# Patient Record
Sex: MALE | Race: OTHER | NOT HISPANIC OR LATINO | URBAN - METROPOLITAN AREA
[De-identification: names, ages, dates, MRNs, and addresses within clinical notes are randomized per-mention and may not be internally consistent; named-entity substitution may affect disease eponyms.]

---

## 2019-07-24 ENCOUNTER — INPATIENT (INPATIENT)
Facility: HOSPITAL | Age: 84
LOS: 1 days | Discharge: HOME | End: 2019-07-26
Attending: INTERNAL MEDICINE | Admitting: INTERNAL MEDICINE
Payer: MEDICARE

## 2019-07-24 VITALS
DIASTOLIC BLOOD PRESSURE: 85 MMHG | TEMPERATURE: 98 F | HEIGHT: 64 IN | OXYGEN SATURATION: 95 % | WEIGHT: 160.06 LBS | RESPIRATION RATE: 17 BRPM | HEART RATE: 76 BPM | SYSTOLIC BLOOD PRESSURE: 162 MMHG

## 2019-07-24 PROBLEM — Z00.00 ENCOUNTER FOR PREVENTIVE HEALTH EXAMINATION: Status: ACTIVE | Noted: 2019-07-24

## 2019-07-24 LAB
ALBUMIN SERPL ELPH-MCNC: 3.8 G/DL — SIGNIFICANT CHANGE UP (ref 3.3–5)
ALP SERPL-CCNC: 51 U/L — SIGNIFICANT CHANGE UP (ref 40–120)
ALT FLD-CCNC: 39 U/L — SIGNIFICANT CHANGE UP (ref 12–78)
AMYLASE P1 CFR SERPL: 133 U/L — HIGH (ref 25–115)
ANION GAP SERPL CALC-SCNC: 9 MMOL/L — SIGNIFICANT CHANGE UP (ref 5–17)
APPEARANCE UR: CLEAR — SIGNIFICANT CHANGE UP
AST SERPL-CCNC: 29 U/L — SIGNIFICANT CHANGE UP (ref 15–37)
BACTERIA # UR AUTO: ABNORMAL
BASOPHILS # BLD AUTO: 0.05 K/UL — SIGNIFICANT CHANGE UP (ref 0–0.2)
BASOPHILS NFR BLD AUTO: 0.6 % — SIGNIFICANT CHANGE UP (ref 0–2)
BILIRUB SERPL-MCNC: 0.8 MG/DL — SIGNIFICANT CHANGE UP (ref 0.2–1.2)
BILIRUB UR-MCNC: NEGATIVE — SIGNIFICANT CHANGE UP
BUN SERPL-MCNC: 25 MG/DL — HIGH (ref 7–23)
CALCIUM SERPL-MCNC: 9.1 MG/DL — SIGNIFICANT CHANGE UP (ref 8.5–10.1)
CHLORIDE SERPL-SCNC: 104 MMOL/L — SIGNIFICANT CHANGE UP (ref 96–108)
CO2 SERPL-SCNC: 23 MMOL/L — SIGNIFICANT CHANGE UP (ref 22–31)
COLOR SPEC: YELLOW — SIGNIFICANT CHANGE UP
CREAT SERPL-MCNC: 1.7 MG/DL — HIGH (ref 0.5–1.3)
DIFF PNL FLD: NEGATIVE — SIGNIFICANT CHANGE UP
EOSINOPHIL # BLD AUTO: 0.3 K/UL — SIGNIFICANT CHANGE UP (ref 0–0.5)
EOSINOPHIL NFR BLD AUTO: 3.6 % — SIGNIFICANT CHANGE UP (ref 0–6)
GLUCOSE SERPL-MCNC: 197 MG/DL — HIGH (ref 70–99)
GLUCOSE UR QL: NEGATIVE MG/DL — SIGNIFICANT CHANGE UP
HCT VFR BLD CALC: 41.8 % — SIGNIFICANT CHANGE UP (ref 39–50)
HGB BLD-MCNC: 13.6 G/DL — SIGNIFICANT CHANGE UP (ref 13–17)
IMM GRANULOCYTES NFR BLD AUTO: 0.7 % — SIGNIFICANT CHANGE UP (ref 0–1.5)
KETONES UR-MCNC: NEGATIVE — SIGNIFICANT CHANGE UP
LEUKOCYTE ESTERASE UR-ACNC: NEGATIVE — SIGNIFICANT CHANGE UP
LIDOCAIN IGE QN: 330 U/L — SIGNIFICANT CHANGE UP (ref 73–393)
LYMPHOCYTES # BLD AUTO: 1.45 K/UL — SIGNIFICANT CHANGE UP (ref 1–3.3)
LYMPHOCYTES # BLD AUTO: 17.5 % — SIGNIFICANT CHANGE UP (ref 13–44)
MAGNESIUM SERPL-MCNC: 2.3 MG/DL — SIGNIFICANT CHANGE UP (ref 1.6–2.6)
MCHC RBC-ENTMCNC: 26.6 PG — LOW (ref 27–34)
MCHC RBC-ENTMCNC: 32.5 GM/DL — SIGNIFICANT CHANGE UP (ref 32–36)
MCV RBC AUTO: 81.8 FL — SIGNIFICANT CHANGE UP (ref 80–100)
MONOCYTES # BLD AUTO: 0.54 K/UL — SIGNIFICANT CHANGE UP (ref 0–0.9)
MONOCYTES NFR BLD AUTO: 6.5 % — SIGNIFICANT CHANGE UP (ref 2–14)
NEUTROPHILS # BLD AUTO: 5.9 K/UL — SIGNIFICANT CHANGE UP (ref 1.8–7.4)
NEUTROPHILS NFR BLD AUTO: 71.1 % — SIGNIFICANT CHANGE UP (ref 43–77)
NITRITE UR-MCNC: NEGATIVE — SIGNIFICANT CHANGE UP
NRBC # BLD: 0 /100 WBCS — SIGNIFICANT CHANGE UP (ref 0–0)
PH UR: 6 — SIGNIFICANT CHANGE UP (ref 5–8)
PLATELET # BLD AUTO: 134 K/UL — LOW (ref 150–400)
POTASSIUM SERPL-MCNC: 4.1 MMOL/L — SIGNIFICANT CHANGE UP (ref 3.5–5.3)
POTASSIUM SERPL-SCNC: 4.1 MMOL/L — SIGNIFICANT CHANGE UP (ref 3.5–5.3)
PROT SERPL-MCNC: 8 GM/DL — SIGNIFICANT CHANGE UP (ref 6–8.3)
PROT UR-MCNC: 30 MG/DL
RBC # BLD: 5.11 M/UL — SIGNIFICANT CHANGE UP (ref 4.2–5.8)
RBC # FLD: 13.1 % — SIGNIFICANT CHANGE UP (ref 10.3–14.5)
SODIUM SERPL-SCNC: 136 MMOL/L — SIGNIFICANT CHANGE UP (ref 135–145)
SP GR SPEC: 1.01 — SIGNIFICANT CHANGE UP (ref 1.01–1.02)
TROPONIN I SERPL-MCNC: <.015 NG/ML — SIGNIFICANT CHANGE UP (ref 0.01–0.04)
UROBILINOGEN FLD QL: NEGATIVE MG/DL — SIGNIFICANT CHANGE UP
WBC # BLD: 8.3 K/UL — SIGNIFICANT CHANGE UP (ref 3.8–10.5)
WBC # FLD AUTO: 8.3 K/UL — SIGNIFICANT CHANGE UP (ref 3.8–10.5)
WBC UR QL: SIGNIFICANT CHANGE UP

## 2019-07-24 PROCEDURE — 93010 ELECTROCARDIOGRAM REPORT: CPT

## 2019-07-24 PROCEDURE — 70450 CT HEAD/BRAIN W/O DYE: CPT | Mod: 26

## 2019-07-24 PROCEDURE — 99285 EMERGENCY DEPT VISIT HI MDM: CPT

## 2019-07-24 PROCEDURE — 71045 X-RAY EXAM CHEST 1 VIEW: CPT | Mod: 26

## 2019-07-24 PROCEDURE — 99223 1ST HOSP IP/OBS HIGH 75: CPT

## 2019-07-24 RX ORDER — ASPIRIN/CALCIUM CARB/MAGNESIUM 324 MG
1 TABLET ORAL
Qty: 0 | Refills: 0 | DISCHARGE

## 2019-07-24 RX ORDER — DEXTROSE 50 % IN WATER 50 %
12.5 SYRINGE (ML) INTRAVENOUS ONCE
Refills: 0 | Status: DISCONTINUED | OUTPATIENT
Start: 2019-07-24 | End: 2019-07-26

## 2019-07-24 RX ORDER — MECLIZINE HCL 12.5 MG
25 TABLET ORAL ONCE
Refills: 0 | Status: COMPLETED | OUTPATIENT
Start: 2019-07-24 | End: 2019-07-24

## 2019-07-24 RX ORDER — METOPROLOL TARTRATE 50 MG
1 TABLET ORAL
Qty: 0 | Refills: 0 | DISCHARGE

## 2019-07-24 RX ORDER — DEXTROSE 50 % IN WATER 50 %
15 SYRINGE (ML) INTRAVENOUS ONCE
Refills: 0 | Status: DISCONTINUED | OUTPATIENT
Start: 2019-07-24 | End: 2019-07-26

## 2019-07-24 RX ORDER — SODIUM CHLORIDE 9 MG/ML
1000 INJECTION INTRAMUSCULAR; INTRAVENOUS; SUBCUTANEOUS ONCE
Refills: 0 | Status: COMPLETED | OUTPATIENT
Start: 2019-07-24 | End: 2019-07-24

## 2019-07-24 RX ORDER — GLUCAGON INJECTION, SOLUTION 0.5 MG/.1ML
1 INJECTION, SOLUTION SUBCUTANEOUS ONCE
Refills: 0 | Status: DISCONTINUED | OUTPATIENT
Start: 2019-07-24 | End: 2019-07-26

## 2019-07-24 RX ORDER — ASPIRIN/CALCIUM CARB/MAGNESIUM 324 MG
81 TABLET ORAL DAILY
Refills: 0 | Status: DISCONTINUED | OUTPATIENT
Start: 2019-07-24 | End: 2019-07-26

## 2019-07-24 RX ORDER — ONDANSETRON 8 MG/1
4 TABLET, FILM COATED ORAL ONCE
Refills: 0 | Status: COMPLETED | OUTPATIENT
Start: 2019-07-24 | End: 2019-07-24

## 2019-07-24 RX ORDER — ASPIRIN/CALCIUM CARB/MAGNESIUM 324 MG
300 TABLET ORAL DAILY
Refills: 0 | Status: DISCONTINUED | OUTPATIENT
Start: 2019-07-24 | End: 2019-07-24

## 2019-07-24 RX ORDER — SODIUM CHLORIDE 9 MG/ML
1000 INJECTION, SOLUTION INTRAVENOUS
Refills: 0 | Status: DISCONTINUED | OUTPATIENT
Start: 2019-07-24 | End: 2019-07-26

## 2019-07-24 RX ORDER — LINAGLIPTIN 5 MG/1
1 TABLET, FILM COATED ORAL
Qty: 0 | Refills: 0 | DISCHARGE

## 2019-07-24 RX ORDER — DEXTROSE 50 % IN WATER 50 %
25 SYRINGE (ML) INTRAVENOUS ONCE
Refills: 0 | Status: DISCONTINUED | OUTPATIENT
Start: 2019-07-24 | End: 2019-07-26

## 2019-07-24 RX ORDER — INSULIN LISPRO 100/ML
VIAL (ML) SUBCUTANEOUS
Refills: 0 | Status: DISCONTINUED | OUTPATIENT
Start: 2019-07-24 | End: 2019-07-26

## 2019-07-24 RX ORDER — METOPROLOL TARTRATE 50 MG
25 TABLET ORAL
Refills: 0 | Status: DISCONTINUED | OUTPATIENT
Start: 2019-07-24 | End: 2019-07-26

## 2019-07-24 RX ORDER — IRBESARTAN 75 MG/1
1 TABLET ORAL
Qty: 0 | Refills: 0 | DISCHARGE

## 2019-07-24 RX ADMIN — SODIUM CHLORIDE 1000 MILLILITER(S): 9 INJECTION INTRAMUSCULAR; INTRAVENOUS; SUBCUTANEOUS at 19:54

## 2019-07-24 RX ADMIN — SODIUM CHLORIDE 1000 MILLILITER(S): 9 INJECTION INTRAMUSCULAR; INTRAVENOUS; SUBCUTANEOUS at 23:36

## 2019-07-24 RX ADMIN — ONDANSETRON 4 MILLIGRAM(S): 8 TABLET, FILM COATED ORAL at 19:54

## 2019-07-24 RX ADMIN — Medication 25 MILLIGRAM(S): at 19:55

## 2019-07-24 NOTE — ED ADULT NURSE NOTE - OBJECTIVE STATEMENT
Pt presents to ED for dizziness accompanied with vomiting and subsequent abd pain. Pt states onset at rest at approx 5pm. He became dizzy with eye opening and began vomiting. Has vomited approx 5x, no hematemesis. no cp. no SOB. no sick contacts. skin is moist but warm. Pt vomited x 1 in ED. respirations even and unlabored. vitals as noted. rsr on monitor, no ectopy. slight pedal edema noted. abd soft. non tender. no diarrhea. iv placed, labs drawn and sent, 20 ga left fa. meds given as per orders. will continue to monitor awaiting ct scan.

## 2019-07-24 NOTE — H&P ADULT - NSHPPHYSICALEXAM_GEN_ALL_CORE
T(C): 36.7 (24 Jul 2019 23:35), Max: 36.7 (24 Jul 2019 23:35)  T(F): 98.1 (24 Jul 2019 23:35), Max: 98.1 (24 Jul 2019 23:35)  HR: 77 (24 Jul 2019 23:35) (76 - 80)  BP: 149/81 (24 Jul 2019 23:35) (149/81 - 162/85)  BP(mean): --  RR: 17 (24 Jul 2019 23:35) (16 - 17)  SpO2: 95% (24 Jul 2019 23:35) (95% - 96%)    PHYSICAL EXAM:  GENERAL: NAD, well-groomed, well-developed  HEAD:  Atraumatic, Normocephalic  EYES: EOMI, PERRLA, conjunctiva and sclera clear  ENMT: No tonsillar erythema, exudates, or enlargement; Moist mucous membranes, Good dentition, No lesions  NECK: Supple, No JVD, Normal thyroid  NERVOUS SYSTEM:  Alert & Oriented X3,CN 2-12 intact, no focal deficits, no nystagmus, F-N intact  CHEST/LUNG: Clear to percussion bilaterally; No rales, rhonchi, wheezing, or rubs  HEART: Regular rate and rhythm; No murmurs, rubs, or gallops  ABDOMEN: Soft, Nontender, Nondistended; Bowel sounds present  EXTREMITIES:  + Peripheral Pulses, No clubbing, cyanosis, or edema  LYMPH: No lymphadenopathy noted  SKIN: No rashes or lesions

## 2019-07-24 NOTE — H&P ADULT - NSHPREVIEWOFSYSTEMS_GEN_ALL_CORE
REVIEW OF SYSTEMS:  CONSTITUTIONAL: No fever, weight loss, or fatigue  EYES: No eye pain, visual disturbances, or discharge  ENMT:  No difficulty hearing, +tinnitus, vertigo; No sinus or throat pain  NECK: No pain or stiffness  RESPIRATORY: No cough, wheezing, chills or hemoptysis; No shortness of breath  CARDIOVASCULAR: No chest pain, palpitations, +dizziness, no leg swelling  GASTROINTESTINAL: No abdominal or epigastric pain. + nausea, vomiting, or hematemesis; No diarrhea or constipation. No melena or hematochezia.  GENITOURINARY: No dysuria, frequency, hematuria, or incontinence  NEUROLOGICAL: No headaches, memory loss, loss of strength, numbness, or tremors  SKIN: No itching, burning, rashes, or lesions   LYMPH NODES: No enlarged glands  ENDOCRINE: No heat or cold intolerance; No hair loss  MUSCULOSKELETAL: No joint pain or swelling; No muscle, back, or extremity pain  PSYCHIATRIC: No depression, anxiety, mood swings, or difficulty sleeping  HEME/LYMPH: No easy bruising, or bleeding gums  ALLERGY AND IMMUNOLOGIC: No hives or eczema

## 2019-07-24 NOTE — ED PROVIDER NOTE - OBJECTIVE STATEMENT
Pertinent PMH/PSH/FHx/SHx and Review of Systems contained within:  Patient presents to the ED for dizziness.  Patient finished showering and was unable to balance coming out of shower, felt nauseated, sudden onset 3 hours ago.  Feels generalized weakness, vision feels blurry, patient vomited 5x.  Denies any headache, focal numbness, chest pain, palpitations, or dyspnea.      Relevant PMHx/SHx/SOCHx/FAMH:  HTN, DM, MI s/p 1 stent, DM  Patient denies EtOH/tobacco/illicit substance use.    ROS: No fever/chills, No headache/photophobia/eye pain, No ear pain/sore throat/dysphagia, No chest pain/palpitations, no SOB/cough/wheeze/stridor, No abdominal pain, No D/melena, no dysuria/frequency/discharge, No neck/back pain, no rash.

## 2019-07-24 NOTE — ED PROVIDER NOTE - CLINICAL SUMMARY MEDICAL DECISION MAKING FREE TEXT BOX
Patient with new onset dizziness.  VSS.  neuro intact.  CT head negatvie for bleed.  Labs with mild renal failure.  Dizziness improved but still present.  Neuro consulted. Patient is to be admitted to the hospital and the case was discussed with the admitting physician.  Any changes in plan, additional imaging/labs, and further work up will be at the discretion of the admitting physician.

## 2019-07-24 NOTE — ED ADULT NURSE REASSESSMENT NOTE - NS ED NURSE REASSESS COMMENT FT1
Pt states complete relief of dizziness and n/v. no complaints at this time. respirations even and unlabored. UA and C&S sent. Pt and family advised that the plan is for admission to tele for monitoring w MRI tmrw. will continue to monitor awaiting placement on unit.

## 2019-07-24 NOTE — H&P ADULT - NSHPLABSRESULTS_GEN_ALL_CORE
LABS:                        13.6   8.30  )-----------( 134      ( 2019 19:59 )             41.8         136  |  104  |  25<H>  ----------------------------<  197<H>  4.1   |  23  |  1.70<H>    Ca    9.1      2019 19:59  Mg     2.3         TPro  8.0  /  Alb  3.8  /  TBili  0.8  /  DBili  x   /  AST  29  /  ALT  39  /  AlkPhos  51      Troponin I, Serum: <.015: ng/mL (19 @ 19:59)    Urinalysis Basic - ( 2019 21:44 )    Color: Yellow / Appearance: Clear / S.010 / pH: x  Gluc: x / Ketone: Negative  / Bili: Negative / Urobili: Negative mg/dL   Blood: x / Protein: 30 mg/dL / Nitrite: Negative   Leuk Esterase: Negative / RBC: x / WBC 0-2   Sq Epi: x / Non Sq Epi: x / Bacteria: Few      CAPILLARY BLOOD GLUCOSE        RADIOLOGY & ADDITIONAL TESTS:  < from: CT Head No Cont (19 @ 20:08) >    IMPRESSION:     No acute intracranial hemorrhage, mass effect, or evidence of acute   vascular territorial infarction.    < from: Xray Chest 1 View- PORTABLE-Urgent (19 @ 20:23) >    IMPRESSION:    Cardiomegaly. Clear lungs.    < end of copied text >          Imaging Personally Reviewed:  [x ] YES  [ ] NO  EKG: sinus@ 74 RBBB no acute ischemic changes

## 2019-07-24 NOTE — ED ADULT TRIAGE NOTE - CHIEF COMPLAINT QUOTE
as per pt " about 1 hour ago after showering I began feeling dizzy with nausea, 5 episodes of vomiting." hx htn, dm, hdl, 1 stent placed 2013

## 2019-07-24 NOTE — H&P ADULT - HISTORY OF PRESENT ILLNESS
82 y/o Male with history of CAD status post stent (2013), HTN, HLD, DMT2 - non insulin dependent. Last well known 5pm, presented with acute onset Dizziness which developed while taking a hot shower. He had significant difficulty ambulating after its onset. he then developed associated nausea, vomiting, blurred vision, and bilateral ringing in his ears. There were no prior episodes. During the time of the event he checked his BP (158/82) became alarmed and took an additional dose of Irbesartan 300mg prior to arrival. He has no prior history of renal dysfunction. He denies: fatigue, recent illness, palpitations, chest pain, abdominal pain, diarrhea, or dysuria.   He reports complete resolution of his symptoms status post medication administration while in ED. Pt was working in his garden prior to episode, states he was thirsty.

## 2019-07-24 NOTE — H&P ADULT - PROBLEM SELECTOR PLAN 1
possible TIA vs presyncope, monitor telemetry, asa, anticoagulation, statin  MRI, carotid doppler, echo, neurology consult

## 2019-07-24 NOTE — CONSULT NOTE ADULT - SUBJECTIVE AND OBJECTIVE BOX
REASON FOR Tele-evaluation    SUBJECTIVE: dizziness, nausea, vomiting, blur vision and ataxia     HPI: This is an 84 y/o Male with history of CAD status post stent (), HTN, HLD, DMT2 - non insulin dependent. Last well known 5pm, presented with acute onset Dizziness which developed while taking a shower. He had significant difficulty ambulating after its onset. he then developed associated nausea, vomiting, blur vision, and bilateral ringing in his ears. he has no prior episodes. during the time of the event he checked his BP (158/82) became alarmed and took an additional dose of Irbesartan 300mg prior to arrival. He has no prior history of renal dysfunction. He denies: fatigue, recent illness, palpitations, chest pain, abdominal pain, diarrhea, or dysuria.   He reports complete resolution of his symptoms status post medication administration while in ED.     OBJECTIVE  ROS:  all other negatived except as documented above.     PHYSICAL EXAM: Tele-evaluation precludes physical exam. Pertinent physical exam findings as per verbal communication by …… and nurse at bedside are as following:  Vital Signs Last 24 Hrs  T(C): 36.6 (2019 18:50), Max: 36.6 (2019 18:50)  T(F): 97.9 (2019 18:50), Max: 97.9 (2019 18:50)  HR: 80 (2019 21:30) (76 - 80)  BP: 152/82 (2019 21:30) (152/82 - 162/85)  BP(mean): --  RR: 17 (2019 21:30) (16 - 17)  SpO2: 95% (2019 21:30) (95% - 96%)                            13.6   8.30  )-----------( 134      ( 2019 19:59 )             41.8         136  |  104  |  25<H>  ----------------------------<  197<H>  4.1   |  23  |  1.70<H>    Ca    9.1      2019 19:59  Mg     2.3         TPro  8.0  /  Alb  3.8  /  TBili  0.8  /  DBili  x   /  AST  29  /  ALT  39  /  AlkPhos  51  -    CARDIAC MARKERS ( 2019 19:59 )  <.015 ng/mL / x     / x     / x     / x          Urinalysis Basic - ( 2019 21:44 )    Color: Yellow / Appearance: Clear / S.010 / pH: x  Gluc: x / Ketone: Negative  / Bili: Negative / Urobili: Negative mg/dL   Blood: x / Protein: 30 mg/dL / Nitrite: Negative   Leuk Esterase: Negative / RBC: x / WBC 0-2   Sq Epi: x / Non Sq Epi: x / Bacteria: Few      ASSESSMENT AND PLAN:   84 y/o male with history of HTN, HLD, DMT2 - non insulin dependent and CAD status post stent (). presented after an acute onset dizziness at 5pm with associated ataxia, nausea, vomiting (3-4 episodes, food content), blur vision and bilateral ringing sensation in his ears. He has no prior episodes. At the time of onset patient checked his blood pressure (158/82) and took an extra dose of his Irbesartan 300mg. Pt is currently asymptomatic.     Admit to medical unit - telemetry   r/o TIA/CVA  - MRI brain  - Bilateral Carotid duplex  - Telemetry monitoring  - F/U Lipid and A1C  - Neurology consulted by ED attending  - OOB with assistance  - PT/OT for safe ambulation   - status post Zofran and Meclizine after which his symptoms resolved.   - Neuro checks Q4hrs    Acute renal Failure  - no prior labs to compare  - hold ARB  - avoid nephrotoxic medications  - cont to monitor   - status post 1L NS IV hydration    H/O HLD, patient is currently on meds but can not recall the name, will start Statin, please re-evaluate.     H/O DMT2, non insulin dependent, with hyperglycemia. hold Tradjenta, RISS, accu check, Hypoglycemia protocol, ADA diet.     H/O HTN, uncontrolled.   - held ARB - noted above  - cont Metoprolol, dose increased from 25mg daily to 25mg BID (as his ARB is being held)  - cont to monitor.     H/O CAD status post stent (), stable.   - cont aspirin 81mg.     DVT prophylaxis.     Care plan discussed with REASON FOR Tele-evaluation    SUBJECTIVE: dizziness, nausea, vomiting, blur vision and ataxia     HPI: This is an 84 y/o Male with history of CAD status post stent (), HTN, HLD, DMT2 - non insulin dependent. Last well known 5pm, presented with acute onset Dizziness which developed while taking a shower. He had significant difficulty ambulating after its onset. he then developed associated nausea, vomiting, blur vision, and bilateral ringing in his ears. he has no prior episodes. during the time of the event he checked his BP (158/82) became alarmed and took an additional dose of Irbesartan 300mg prior to arrival. He has no prior history of renal dysfunction. He denies: fatigue, recent illness, palpitations, chest pain, abdominal pain, diarrhea, or dysuria.   He reports complete resolution of his symptoms status post medication administration while in ED.     OBJECTIVE  ROS:  all other negatived except as documented above.     PHYSICAL EXAM: Tele-evaluation precludes physical exam. Pertinent physical exam findings as per verbal communication by …… and nurse at bedside are as following:  Vital Signs Last 24 Hrs  T(C): 36.6 (2019 18:50), Max: 36.6 (2019 18:50)  T(F): 97.9 (2019 18:50), Max: 97.9 (2019 18:50)  HR: 80 (2019 21:30) (76 - 80)  BP: 152/82 (2019 21:30) (152/82 - 162/85)  BP(mean): --  RR: 17 (2019 21:30) (16 - 17)  SpO2: 95% (2019 21:30) (95% - 96%)                            13.6   8.30  )-----------( 134      ( 2019 19:59 )             41.8         136  |  104  |  25<H>  ----------------------------<  197<H>  4.1   |  23  |  1.70<H>    Ca    9.1      2019 19:59  Mg     2.3         TPro  8.0  /  Alb  3.8  /  TBili  0.8  /  DBili  x   /  AST  29  /  ALT  39  /  AlkPhos  51  -    CARDIAC MARKERS ( 2019 19:59 )  <.015 ng/mL / x     / x     / x     / x          Urinalysis Basic - ( 2019 21:44 )    Color: Yellow / Appearance: Clear / S.010 / pH: x  Gluc: x / Ketone: Negative  / Bili: Negative / Urobili: Negative mg/dL   Blood: x / Protein: 30 mg/dL / Nitrite: Negative   Leuk Esterase: Negative / RBC: x / WBC 0-2   Sq Epi: x / Non Sq Epi: x / Bacteria: Few      ASSESSMENT AND PLAN:   84 y/o male with history of HTN, HLD, DMT2 - non insulin dependent and CAD status post stent (). presented after an acute onset dizziness at 5pm with associated ataxia, nausea, vomiting (3-4 episodes, food content), blur vision and bilateral ringing sensation in his ears. He has no prior episodes. At the time of onset patient checked his blood pressure (158/82) and took an extra dose of his Irbesartan 300mg. Pt is currently asymptomatic.     Admit to medical unit - telemetry   r/o TIA/CVA  - MRI brain  - Bilateral Carotid duplex  - Telemetry monitoring  - F/U Lipid and A1C  - Neurology consulted by ED attending  - OOB with assistance  - PT/OT for safe ambulation   - status post Zofran and Meclizine after which his symptoms resolved.   - Neuro checks Q4hrs    Acute renal Failure  - no prior labs to compare  - hold ARB  - avoid nephrotoxic medications  - cont to monitor   - status post 1L NS IV hydration    H/O HLD, patient is currently on meds but can not recall the name, will start Statin, please re-evaluate.     H/O DMT2, non insulin dependent, with hyperglycemia. hold Tradjenta, RISS, accu check, Hypoglycemia protocol, ADA diet.     H/O HTN, uncontrolled.   - held ARB - noted above  - cont Metoprolol, dose increased from 25mg daily to 25mg BID (as his ARB is being held)  - cont to monitor.     H/O CAD status post stent (), stable.   - cont aspirin 81mg.     DVT prophylaxis.     Care plan discussed with Dr. Moralez

## 2019-07-24 NOTE — ED PROVIDER NOTE - PHYSICAL EXAMINATION
Gen: Alert, distressed, eyes closed  Head: NC, AT, PERRL, EOMI, normal lids/conjunctiva, no nystagmus  ENT: normal hearing, patent oropharynx without erythema/exudate, uvula midline  Neck: +supple, no tenderness/meningismus/JVD, +Trachea midline  Pulm: Bilateral BS, normal resp effort, no wheeze/stridor/retractions  CV: RRR, no M/R/G, +dist pulses  Abd: soft, NT/ND, Negative Hildebran signs, +BS, no palpable masses  Mskel: no edema/erythema/cyanosis  Skin: no rash, warm/dry  Neuro: AAOx3, no apparent sensory/motor deficits, BL straight leg raise, coordination intact

## 2019-07-24 NOTE — H&P ADULT - ASSESSMENT
84 y/o Male with history of CAD status post stent (2013), HTN, HLD, DMT2 - non insulin dependent. Last well known 5pm, presented with acute onset Dizziness which developed while taking a hot shower. He had significant difficulty ambulating after its onset. he then developed associated nausea, vomiting, blurred vision, and bilateral ringing in his ears. There were no prior episodes. During the time of the event he checked his BP (158/82) became alarmed and took an additional dose of Irbesartan 300mg prior to arrival. He has no prior history of renal dysfunction. He denies: fatigue, recent illness, palpitations, chest pain, abdominal pain, diarrhea, or dysuria.   He reports complete resolution of his symptoms status post medication administration while in ED. Pt was working in his garden prior to episode, states he was thirsty.  All symptoms resolved at present, NIHSS=0; possible presyncope vs TIA.  IMPROVE VTE Individual Risk Assessment          RISK                                                          Points    [  ] Previous VTE                                                3    [  ] Thrombophilia                                             2    [  ] Lower limb paralysis                                    2        (unable to hold up >15 seconds)      [  ] Current Cancer                                             2         (within 6 months)    [  ] Immobilization > 24 hrs                              1    [  ] ICU/CCU stay > 24 hours                            1    [x  ] Age > 60                                                    1    IMPROVE VTE Score ____1_____

## 2019-07-25 DIAGNOSIS — I10 ESSENTIAL (PRIMARY) HYPERTENSION: ICD-10-CM

## 2019-07-25 DIAGNOSIS — Z98.49 CATARACT EXTRACTION STATUS, UNSPECIFIED EYE: Chronic | ICD-10-CM

## 2019-07-25 DIAGNOSIS — R42 DIZZINESS AND GIDDINESS: ICD-10-CM

## 2019-07-25 DIAGNOSIS — E11.9 TYPE 2 DIABETES MELLITUS WITHOUT COMPLICATIONS: ICD-10-CM

## 2019-07-25 LAB
ANION GAP SERPL CALC-SCNC: 9 MMOL/L — SIGNIFICANT CHANGE UP (ref 5–17)
BASOPHILS # BLD AUTO: 0.03 K/UL — SIGNIFICANT CHANGE UP (ref 0–0.2)
BASOPHILS NFR BLD AUTO: 0.5 % — SIGNIFICANT CHANGE UP (ref 0–2)
BUN SERPL-MCNC: 22 MG/DL — SIGNIFICANT CHANGE UP (ref 7–23)
CALCIUM SERPL-MCNC: 8.9 MG/DL — SIGNIFICANT CHANGE UP (ref 8.5–10.1)
CHLORIDE SERPL-SCNC: 107 MMOL/L — SIGNIFICANT CHANGE UP (ref 96–108)
CHOLEST SERPL-MCNC: 139 MG/DL — SIGNIFICANT CHANGE UP (ref 10–199)
CO2 SERPL-SCNC: 25 MMOL/L — SIGNIFICANT CHANGE UP (ref 22–31)
CREAT SERPL-MCNC: 1.51 MG/DL — HIGH (ref 0.5–1.3)
EOSINOPHIL # BLD AUTO: 0.08 K/UL — SIGNIFICANT CHANGE UP (ref 0–0.5)
EOSINOPHIL NFR BLD AUTO: 1.3 % — SIGNIFICANT CHANGE UP (ref 0–6)
ESTIMATED AVERAGE GLUCOSE: 157 MG/DL — HIGH (ref 68–114)
GLUCOSE BLDC GLUCOMTR-MCNC: 139 MG/DL — HIGH (ref 70–99)
GLUCOSE SERPL-MCNC: 129 MG/DL — HIGH (ref 70–99)
HBA1C BLD-MCNC: 7.1 % — HIGH (ref 4–5.6)
HBA1C BLD-MCNC: 7.1 % — HIGH (ref 4–5.6)
HCT VFR BLD CALC: 40.5 % — SIGNIFICANT CHANGE UP (ref 39–50)
HDLC SERPL-MCNC: 32 MG/DL — LOW
HGB BLD-MCNC: 13 G/DL — SIGNIFICANT CHANGE UP (ref 13–17)
IMM GRANULOCYTES NFR BLD AUTO: 0.5 % — SIGNIFICANT CHANGE UP (ref 0–1.5)
LIPID PNL WITH DIRECT LDL SERPL: 81 MG/DL — SIGNIFICANT CHANGE UP
LYMPHOCYTES # BLD AUTO: 1.22 K/UL — SIGNIFICANT CHANGE UP (ref 1–3.3)
LYMPHOCYTES # BLD AUTO: 19.3 % — SIGNIFICANT CHANGE UP (ref 13–44)
MCHC RBC-ENTMCNC: 26.5 PG — LOW (ref 27–34)
MCHC RBC-ENTMCNC: 32.1 GM/DL — SIGNIFICANT CHANGE UP (ref 32–36)
MCV RBC AUTO: 82.5 FL — SIGNIFICANT CHANGE UP (ref 80–100)
MONOCYTES # BLD AUTO: 0.52 K/UL — SIGNIFICANT CHANGE UP (ref 0–0.9)
MONOCYTES NFR BLD AUTO: 8.2 % — SIGNIFICANT CHANGE UP (ref 2–14)
NEUTROPHILS # BLD AUTO: 4.45 K/UL — SIGNIFICANT CHANGE UP (ref 1.8–7.4)
NEUTROPHILS NFR BLD AUTO: 70.2 % — SIGNIFICANT CHANGE UP (ref 43–77)
NRBC # BLD: 0 /100 WBCS — SIGNIFICANT CHANGE UP (ref 0–0)
PLATELET # BLD AUTO: 135 K/UL — LOW (ref 150–400)
POTASSIUM SERPL-MCNC: 4.8 MMOL/L — SIGNIFICANT CHANGE UP (ref 3.5–5.3)
POTASSIUM SERPL-SCNC: 4.8 MMOL/L — SIGNIFICANT CHANGE UP (ref 3.5–5.3)
RBC # BLD: 4.91 M/UL — SIGNIFICANT CHANGE UP (ref 4.2–5.8)
RBC # FLD: 13.2 % — SIGNIFICANT CHANGE UP (ref 10.3–14.5)
SODIUM SERPL-SCNC: 141 MMOL/L — SIGNIFICANT CHANGE UP (ref 135–145)
TOTAL CHOLESTEROL/HDL RATIO MEASUREMENT: 4.3 RATIO — SIGNIFICANT CHANGE UP (ref 3.4–9.6)
TRIGL SERPL-MCNC: 131 MG/DL — SIGNIFICANT CHANGE UP (ref 10–149)
WBC # BLD: 6.33 K/UL — SIGNIFICANT CHANGE UP (ref 3.8–10.5)
WBC # FLD AUTO: 6.33 K/UL — SIGNIFICANT CHANGE UP (ref 3.8–10.5)

## 2019-07-25 PROCEDURE — 99233 SBSQ HOSP IP/OBS HIGH 50: CPT

## 2019-07-25 PROCEDURE — 93880 EXTRACRANIAL BILAT STUDY: CPT | Mod: 26

## 2019-07-25 PROCEDURE — 70551 MRI BRAIN STEM W/O DYE: CPT | Mod: 26

## 2019-07-25 RX ORDER — HEPARIN SODIUM 5000 [USP'U]/ML
5000 INJECTION INTRAVENOUS; SUBCUTANEOUS EVERY 12 HOURS
Refills: 0 | Status: DISCONTINUED | OUTPATIENT
Start: 2019-07-25 | End: 2019-07-26

## 2019-07-25 RX ORDER — ATORVASTATIN CALCIUM 80 MG/1
20 TABLET, FILM COATED ORAL AT BEDTIME
Refills: 0 | Status: DISCONTINUED | OUTPATIENT
Start: 2019-07-25 | End: 2019-07-26

## 2019-07-25 RX ORDER — SODIUM CHLORIDE 9 MG/ML
1000 INJECTION INTRAMUSCULAR; INTRAVENOUS; SUBCUTANEOUS
Refills: 0 | Status: DISCONTINUED | OUTPATIENT
Start: 2019-07-25 | End: 2019-07-26

## 2019-07-25 RX ADMIN — HEPARIN SODIUM 5000 UNIT(S): 5000 INJECTION INTRAVENOUS; SUBCUTANEOUS at 17:52

## 2019-07-25 RX ADMIN — ATORVASTATIN CALCIUM 20 MILLIGRAM(S): 80 TABLET, FILM COATED ORAL at 21:57

## 2019-07-25 RX ADMIN — Medication 81 MILLIGRAM(S): at 15:51

## 2019-07-25 RX ADMIN — Medication 25 MILLIGRAM(S): at 17:53

## 2019-07-25 RX ADMIN — Medication 25 MILLIGRAM(S): at 06:08

## 2019-07-25 RX ADMIN — SODIUM CHLORIDE 80 MILLILITER(S): 9 INJECTION INTRAMUSCULAR; INTRAVENOUS; SUBCUTANEOUS at 02:00

## 2019-07-25 RX ADMIN — HEPARIN SODIUM 5000 UNIT(S): 5000 INJECTION INTRAVENOUS; SUBCUTANEOUS at 06:08

## 2019-07-25 NOTE — OCCUPATIONAL THERAPY INITIAL EVALUATION ADULT - RANGE OF MOTION EXAMINATION, LOWER EXTREMITY
bilateral LE Active ROM was WFL  (within functional limits)/bilateral LE Active ROM was WNL (within normal limits)/bilateral LE Passive ROM was WFL  (within functional limits)

## 2019-07-25 NOTE — PROGRESS NOTE ADULT - SUBJECTIVE AND OBJECTIVE BOX
Patient is a 83y old  Male who presents with a chief complaint of Dizziness. (2019 23:46)      INTERVAL HPI/OVERNIGHT EVENTS:  Pt was seen and examined, no acute events.    MEDICATIONS  (STANDING):  aspirin enteric coated 81 milliGRAM(s) Oral daily  atorvastatin 20 milliGRAM(s) Oral at bedtime  dextrose 5%. 1000 milliLiter(s) (50 mL/Hr) IV Continuous <Continuous>  dextrose 50% Injectable 12.5 Gram(s) IV Push once  dextrose 50% Injectable 25 Gram(s) IV Push once  dextrose 50% Injectable 25 Gram(s) IV Push once  heparin  Injectable 5000 Unit(s) SubCutaneous every 12 hours  insulin lispro (HumaLOG) corrective regimen sliding scale   SubCutaneous Before meals and at bedtime  metoprolol tartrate 25 milliGRAM(s) Oral two times a day  sodium chloride 0.9%. 1000 milliLiter(s) (80 mL/Hr) IV Continuous <Continuous>    MEDICATIONS  (PRN):  dextrose 40% Gel 15 Gram(s) Oral once PRN Blood Glucose LESS THAN 70 milliGRAM(s)/deciliter  glucagon  Injectable 1 milliGRAM(s) IntraMuscular once PRN Glucose LESS THAN 70 milligrams/deciliter      Allergies  No Known Allergies        Vital Signs Last 24 Hrs  T(C): 36.4 (2019 16:35), Max: 36.7 (2019 23:35)  T(F): 97.5 (2019 16:35), Max: 98.1 (2019 23:35)  HR: 60 (2019 16:35) (54 - 80)  BP: 157/65 (2019 16:35) (140/72 - 162/85)  BP(mean): --  RR: 18 (2019 16:35) (16 - 18)  SpO2: 99% (2019 16:35) (95% - 99%)    PHYSICAL EXAM:  GENERAL: NAD  HEAD:  Atraumatic  EYES: PERRLA  NERVOUS SYSTEM:  A, Ox 3, non focal  CHEST/LUNG: Clear  HEART: RRR  ABDOMEN: Soft, non tender  EXTREMITIES: no edema    LABS:                        13.0   6.33  )-----------( 135      ( 2019 07:24 )             40.5     07-25    141  |  107  |  22  ----------------------------<  129<H>  4.8   |  25  |  1.51<H>    Ca    8.9      2019 07:24  Mg     2.3     07-24    TPro  8.0  /  Alb  3.8  /  TBili  0.8  /  DBili  x   /  AST  29  /  ALT  39  /  AlkPhos  51  07-24      Urinalysis Basic - ( 2019 21:44 )    Color: Yellow / Appearance: Clear / S.010 / pH: x  Gluc: x / Ketone: Negative  / Bili: Negative / Urobili: Negative mg/dL   Blood: x / Protein: 30 mg/dL / Nitrite: Negative   Leuk Esterase: Negative / RBC: x / WBC 0-2   Sq Epi: x / Non Sq Epi: x / Bacteria: Few      CAPILLARY BLOOD GLUCOSE      POCT Blood Glucose.: 121 mg/dL (2019 17:50)  POCT Blood Glucose.: 139 mg/dL (2019 08:07)      RADIOLOGY & ADDITIONAL TESTS:    Imaging Personally Reviewed:  [ ] YES  [ ] NO    Consultant(s) Notes Reviewed:  [ ] YES  [ ] NO    Care Discussed with Consultants/Other Providers [ ] YES  [ ] NO

## 2019-07-25 NOTE — PROGRESS NOTE ADULT - PROBLEM SELECTOR PLAN 1
Resolved.  possible TIA   neg MRI. carotid doppler  Follow up echo Resolved.  possible TIA vs dehydartion  neg MRI. carotid doppler  Follow up echo

## 2019-07-25 NOTE — PROGRESS NOTE ADULT - ASSESSMENT
84 y/o Male with history of CAD status post stent (2013), HTN, HLD, DMT2 - non insulin dependent. Last well known 5pm, presented with acute onset Dizziness which developed while taking a hot shower. He had significant difficulty ambulating after its onset. he then developed associated nausea, vomiting, blurred vision, and bilateral ringing in his ears. There were no prior episodes. During the time of the event he checked his BP (158/82) became alarmed and took an additional dose of Irbesartan 300mg prior to arrival. He has no prior history of renal dysfunction. He denies: fatigue, recent illness, palpitations, chest pain, abdominal pain, diarrhea, or dysuria. He reports complete resolution of his symptoms status post medication administration while in ED. Pt was working in his garden prior to episode, states he was thirsty.

## 2019-07-25 NOTE — OCCUPATIONAL THERAPY INITIAL EVALUATION ADULT - ADDITIONAL COMMENTS
Prior to admission, pt was functioning in her roles, self sufficient & ambulating independently without any assistive devices.  Pt is very active and does his gardening. Pt is right hand dominant and wears glasses for reading. Pt is able to reach out of base of support in sitting without loss of balance but unable to do it in standing. Pt is independent with bed mobility, upper /lower body dressing and functional transfers without use of adaptive device. Pt ambulated to bathroom and at janis in his room with supervision Prior to admission, pt was functioning in her roles, self sufficient & ambulating independently without any assistive devices.  Pt is very active and does his gardening. Pt is right hand dominant and wears glasses for reading. Pt is able to reach out of base of support in sitting and standing without loss of balance. Pt is independent with bed mobility, upper /lower body dressing and functional transfers without use of adaptive device. Pt ambulated to bathroom and at janis in his room with supervision

## 2019-07-25 NOTE — PHYSICAL THERAPY INITIAL EVALUATION ADULT - PERTINENT HX OF CURRENT PROBLEM, REHAB EVAL
Patient admitted with acute onset dizziness with hot shower, with difficulty ambulating after and nausea/vomiting, blurred vision and ringing in ears. Imaging unremarkable.

## 2019-07-25 NOTE — OCCUPATIONAL THERAPY INITIAL EVALUATION ADULT - LIVES WITH, PROFILE
his daughter in a private house with 5 entry steps equipped with close , bilateral hand rails. All living amenities are located on one level. The bathroom has a stall shower, fixed shower head  and comfort height toilet.

## 2019-07-25 NOTE — OCCUPATIONAL THERAPY INITIAL EVALUATION ADULT - PERTINENT HX OF CURRENT PROBLEM, REHAB EVAL
Pt presented to ER due to acute onset of neurological deficit. Pt is diagnosed with TIA. MRI  is pending Pt presented to ER due to acute onset of neurological deficits. Pt is diagnosed with TIA. MRI  is pending

## 2019-07-25 NOTE — CONSULT NOTE ADULT - ASSESSMENT
Historian:  Patient.       ER notes reviewed.    HPI:   LASHAEWAGNER PENA.  84 y/o Male with history of CAD status post stent (), HTN, HLD, DMT2 - non insulin dependent. Last well known 5pm, presented with acute onset Dizziness which developed while taking a hot shower. He had significant difficulty ambulating after its onset. he then developed associated nausea, vomiting, blurred vision, and bilateral ringing in his ears. There were no prior episodes. During the time of the event he checked his BP (158/82) became alarmed and took an additional dose of Irbesartan 300mg prior to arrival. He has no prior history of renal dysfunction. He denies: fatigue, recent illness, palpitations, chest pain, abdominal pain, diarrhea, or dysuria.   He reports complete resolution of his symptoms status post medication administration while in ED. Pt was working in his garden prior to episode, states he was thirsty. (2019 23:46)    Reviewed Radiology Studies:  Brain CT scan:   Negative  Brain MRI:     PAST MEDICAL & SURGICAL HISTORY:  Myocardial infarct  Diabetes mellitus  Hypertension  H/O cataract extraction  83yMale    MEDICATIONS  (STANDING):  aspirin enteric coated 81 milliGRAM(s) Oral daily  atorvastatin 20 milliGRAM(s) Oral at bedtime  dextrose 5%. 1000 milliLiter(s) (50 mL/Hr) IV Continuous <Continuous>  dextrose 50% Injectable 12.5 Gram(s) IV Push once  dextrose 50% Injectable 25 Gram(s) IV Push once  dextrose 50% Injectable 25 Gram(s) IV Push once  heparin  Injectable 5000 Unit(s) SubCutaneous every 12 hours  insulin lispro (HumaLOG) corrective regimen sliding scale   SubCutaneous Before meals and at bedtime  metoprolol tartrate 25 milliGRAM(s) Oral two times a day  sodium chloride 0.9%. 1000 milliLiter(s) (80 mL/Hr) IV Continuous <Continuous>    MEDICATIONS  (PRN):  dextrose 40% Gel 15 Gram(s) Oral once PRN Blood Glucose LESS THAN 70 milliGRAM(s)/deciliter  glucagon  Injectable 1 milliGRAM(s) IntraMuscular once PRN Glucose LESS THAN 70 milligrams/deciliter      Allergies    No Known Allergies    Intolerances      FAMILY HISTORY:  No pertinent family history in first degree relatives    Vital Signs Last 24 Hrs  T(C): 36.4 (2019 16:35), Max: 36.7 (2019 23:35)  T(F): 97.5 (2019 16:35), Max: 98.1 (2019 23:35)  HR: 60 (2019 16:35) (54 - 80)  BP: 157/65 (2019 16:35) (140/72 - 162/85)  BP(mean): --  RR: 18 (2019 16:35) (16 - 18)  SpO2: 99% (2019 16:35) (95% - 99%)    NEUROLOGICAL EXAM:  HENT:  Normocephalic head; atraumatic head.  Neck supple.  ENT: normal looking.  Mental State:    Alert.  Oriented to person, place and date.  Speech clear and intact.  Responds appropriately.  In no acute distress.  Cranial Nerves:  II-XII:   Pupils round and reactive to light.  Extraocular movements full.  Visual fields full.  Visual acuity wnl.  Facial symmetry intact.    Motor Functions:  Moves all extremities.  No pronator drift of UE.  Claps hands well.  Hand  intact bilaterally.     Sensory Functions:   Intact to touch and pinprick to face and extremities.    Reflexes:  Deep tendon reflexes normoactive to knees and ankles.    Cerebellar Testing:    Finger to nose intact.  Nystagmus absent.  Neurovascular: Carotid auscultation full without bruits.      LABS:                        13.0   6.33  )-----------( 135      ( 2019 07:24 )             40.5     07-25    141  |  107  |  22  ----------------------------<  129<H>  4.8   |  25  |  1.51<H>    Ca    8.9      2019 07:24  Mg     2.3     07-24    TPro  8.0  /  Alb  3.8  /  TBili  0.8  /  DBili  x   /  AST  29  /  ALT  39  /  AlkPhos  51  24        Urinalysis Basic - ( 2019 21:44 )    Color: Yellow / Appearance: Clear / S.010 / pH: x  Gluc: x / Ketone: Negative  / Bili: Negative / Urobili: Negative mg/dL   Blood: x / Protein: 30 mg/dL / Nitrite: Negative   Leuk Esterase: Negative / RBC: x / WBC 0-2   Sq Epi: x / Non Sq Epi: x / Bacteria: Few    ASSESSMENT & OPINION:  Dizziness/Vertigo, etiology to be determined.                    RECOMMENDATIONS:  Elective Brain MRI (if no contraindications).  Carotid doppler.  Echocardiogram.  EEG.  Routine serology/chemistries.  Lipid profile.  PT/OT.  Speech Therapy.   DVT prophylaxis as ordered.  Medications:  Continue current medications.  See orders /prescribed. Historian:  Patient.       ER notes reviewed.    HPI:   LASHAEWAGNER PENA.  84 y/o Male with history of CAD status post stent (), HTN, HLD, DMT2 - non insulin dependent. Last well known 5pm, presented with acute onset Dizziness which developed while taking a hot shower. He had significant difficulty ambulating after its onset. he then developed associated nausea, vomiting, blurred vision, and bilateral ringing in his ears. There were no prior episodes. During the time of the event he checked his BP (158/82) became alarmed and took an additional dose of Irbesartan 300mg prior to arrival. He has no prior history of renal dysfunction. He denies: fatigue, recent illness, palpitations, chest pain, abdominal pain, diarrhea, or dysuria.   He reports complete resolution of his symptoms status post medication administration while in ED. Pt was working in his garden prior to episode, states he was thirsty. (2019 23:46)    Reviewed Radiology Studies:  Brain CT scan:   Negative  Brain MRI:     PAST MEDICAL & SURGICAL HISTORY:  Myocardial infarct  Diabetes mellitus  Hypertension  H/O cataract extraction  83yMale    MEDICATIONS  (STANDING):  aspirin enteric coated 81 milliGRAM(s) Oral daily  atorvastatin 20 milliGRAM(s) Oral at bedtime  dextrose 5%. 1000 milliLiter(s) (50 mL/Hr) IV Continuous <Continuous>  dextrose 50% Injectable 12.5 Gram(s) IV Push once  dextrose 50% Injectable 25 Gram(s) IV Push once  dextrose 50% Injectable 25 Gram(s) IV Push once  heparin  Injectable 5000 Unit(s) SubCutaneous every 12 hours  insulin lispro (HumaLOG) corrective regimen sliding scale   SubCutaneous Before meals and at bedtime  metoprolol tartrate 25 milliGRAM(s) Oral two times a day  sodium chloride 0.9%. 1000 milliLiter(s) (80 mL/Hr) IV Continuous <Continuous>    MEDICATIONS  (PRN):  dextrose 40% Gel 15 Gram(s) Oral once PRN Blood Glucose LESS THAN 70 milliGRAM(s)/deciliter  glucagon  Injectable 1 milliGRAM(s) IntraMuscular once PRN Glucose LESS THAN 70 milligrams/deciliter      Allergies    No Known Allergies    Intolerances      FAMILY HISTORY:  No pertinent family history in first degree relatives    Vital Signs Last 24 Hrs  T(C): 36.4 (2019 16:35), Max: 36.7 (2019 23:35)  T(F): 97.5 (2019 16:35), Max: 98.1 (2019 23:35)  HR: 60 (2019 16:35) (54 - 80)  BP: 157/65 (2019 16:35) (140/72 - 162/85)  BP(mean): --  RR: 18 (2019 16:35) (16 - 18)  SpO2: 99% (2019 16:35) (95% - 99%)    NEUROLOGICAL EXAM:  HENT:  Normocephalic head; atraumatic head.  Neck supple.  ENT: normal looking.  Mental State:    Alert.  Oriented to person, place and date.  Speech clear and intact.  Responds appropriately.  In no acute distress.  Cranial Nerves:  II-XII:   Pupils round and reactive to light.  Extraocular movements full.  Visual fields full.  Visual acuity wnl.  Facial symmetry intact.    Motor Functions:  Moves all extremities.  No pronator drift of UE.  Claps hands well.  Hand  intact bilaterally.     Sensory Functions:   Intact to touch and pinprick to face and extremities.    Reflexes:  Deep tendon reflexes normoactive to knees and ankles.    Cerebellar Testing:    Finger to nose intact.  Nystagmus absent.  Neurovascular: Carotid auscultation full without bruits.      LABS:                        13.0   6.33  )-----------( 135      ( 2019 07:24 )             40.5     07-25    141  |  107  |  22  ----------------------------<  129<H>  4.8   |  25  |  1.51<H>    Ca    8.9      2019 07:24  Mg     2.3     07-24    TPro  8.0  /  Alb  3.8  /  TBili  0.8  /  DBili  x   /  AST  29  /  ALT  39  /  AlkPhos  51  24        Urinalysis Basic - ( 2019 21:44 )    Color: Yellow / Appearance: Clear / S.010 / pH: x  Gluc: x / Ketone: Negative  / Bili: Negative / Urobili: Negative mg/dL   Blood: x / Protein: 30 mg/dL / Nitrite: Negative   Leuk Esterase: Negative / RBC: x / WBC 0-2   Sq Epi: x / Non Sq Epi: x / Bacteria: Few    ASSESSMENT & OPINION:  Dizziness/Vertigo, etiology to be determined.    Mild Encephalopathy.                RECOMMENDATIONS:  Elective Brain MRI (if no contraindications).  Carotid doppler.  Echocardiogram.  EEG.  Routine serology/chemistries.  Lipid profile.  PT/OT.  Speech Therapy.   DVT prophylaxis as ordered.  Medications:  Continue current medications.  See orders /prescribed.

## 2019-07-26 ENCOUNTER — TRANSCRIPTION ENCOUNTER (OUTPATIENT)
Age: 84
End: 2019-07-26

## 2019-07-26 VITALS
HEART RATE: 54 BPM | DIASTOLIC BLOOD PRESSURE: 72 MMHG | TEMPERATURE: 97 F | SYSTOLIC BLOOD PRESSURE: 150 MMHG | OXYGEN SATURATION: 98 % | RESPIRATION RATE: 17 BRPM | WEIGHT: 163.58 LBS

## 2019-07-26 DIAGNOSIS — N17.9 ACUTE KIDNEY FAILURE, UNSPECIFIED: ICD-10-CM

## 2019-07-26 LAB
CULTURE RESULTS: NO GROWTH — SIGNIFICANT CHANGE UP
SPECIMEN SOURCE: SIGNIFICANT CHANGE UP

## 2019-07-26 PROCEDURE — 99239 HOSP IP/OBS DSCHRG MGMT >30: CPT

## 2019-07-26 RX ORDER — ATORVASTATIN CALCIUM 80 MG/1
1 TABLET, FILM COATED ORAL
Qty: 30 | Refills: 0
Start: 2019-07-26 | End: 2019-08-24

## 2019-07-26 RX ADMIN — HEPARIN SODIUM 5000 UNIT(S): 5000 INJECTION INTRAVENOUS; SUBCUTANEOUS at 06:22

## 2019-07-26 RX ADMIN — SODIUM CHLORIDE 80 MILLILITER(S): 9 INJECTION INTRAMUSCULAR; INTRAVENOUS; SUBCUTANEOUS at 06:22

## 2019-07-26 RX ADMIN — Medication 25 MILLIGRAM(S): at 06:22

## 2019-07-26 NOTE — DISCHARGE NOTE NURSING/CASE MANAGEMENT/SOCIAL WORK - NSDCDPATPORTLINK_GEN_ALL_CORE
You can access the [facility], offered by [facility], by registering with the following website: [email or web address]

## 2019-07-26 NOTE — DISCHARGE NOTE PROVIDER - NSDCCPCAREPLAN_GEN_ALL_CORE_FT
PRINCIPAL DISCHARGE DIAGNOSIS  Diagnosis: Vertigo  Assessment and Plan of Treatment: Seen by neurologist Dr De La Rosa.   Negative syncope work up.   Also found to have STANISLAW.   Creatinine trending down to 1.87.   Please follow up with PMD for f/up Creatinine level.   Continue home meds.

## 2019-07-26 NOTE — DISCHARGE NOTE PROVIDER - HOSPITAL COURSE
84 y/o Male with history of CAD status post stent (2013), HTN, HLD, DMT2 - non insulin dependent. Admitted with vertigo, seen by neurologist Dr De La Rosa. Negative syncope work up. Also found to have STANISLAW. Creatinine trending down to 1.87. Please follow up with PMD for f/up Creatinine level. Continue home meds.

## 2019-07-26 NOTE — DISCHARGE NOTE NURSING/CASE MANAGEMENT/SOCIAL WORK - NSDCPEPTSTRK_GEN_ALL_CORE
Stroke support groups for patients, families, and friends/Stroke warning signs and symptoms/Signs and symptoms of stroke/Stroke education booklet/Call 911 for stroke/Need for follow up after discharge/Prescribed medications/Risk factors for stroke

## 2019-07-31 DIAGNOSIS — R42 DIZZINESS AND GIDDINESS: ICD-10-CM

## 2019-07-31 DIAGNOSIS — E11.9 TYPE 2 DIABETES MELLITUS WITHOUT COMPLICATIONS: ICD-10-CM

## 2019-07-31 DIAGNOSIS — I10 ESSENTIAL (PRIMARY) HYPERTENSION: ICD-10-CM

## 2019-07-31 DIAGNOSIS — Z79.84 LONG TERM (CURRENT) USE OF ORAL HYPOGLYCEMIC DRUGS: ICD-10-CM

## 2019-07-31 DIAGNOSIS — G93.40 ENCEPHALOPATHY, UNSPECIFIED: ICD-10-CM

## 2019-07-31 DIAGNOSIS — I25.2 OLD MYOCARDIAL INFARCTION: ICD-10-CM

## 2019-07-31 DIAGNOSIS — E78.5 HYPERLIPIDEMIA, UNSPECIFIED: ICD-10-CM

## 2019-07-31 DIAGNOSIS — N17.9 ACUTE KIDNEY FAILURE, UNSPECIFIED: ICD-10-CM

## 2019-07-31 DIAGNOSIS — Z95.5 PRESENCE OF CORONARY ANGIOPLASTY IMPLANT AND GRAFT: ICD-10-CM

## 2019-08-01 ENCOUNTER — OUTPATIENT (OUTPATIENT)
Dept: OUTPATIENT SERVICES | Facility: HOSPITAL | Age: 84
LOS: 1 days | End: 2019-08-01
Payer: MEDICARE

## 2019-08-01 DIAGNOSIS — Z98.49 CATARACT EXTRACTION STATUS, UNSPECIFIED EYE: Chronic | ICD-10-CM

## 2019-08-01 PROCEDURE — G9001: CPT

## 2019-08-12 DIAGNOSIS — Z71.89 OTHER SPECIFIED COUNSELING: ICD-10-CM

## 2019-08-12 PROBLEM — E11.9 TYPE 2 DIABETES MELLITUS WITHOUT COMPLICATIONS: Chronic | Status: ACTIVE | Noted: 2019-07-25

## 2019-08-12 PROBLEM — I21.9 ACUTE MYOCARDIAL INFARCTION, UNSPECIFIED: Chronic | Status: ACTIVE | Noted: 2019-07-25

## 2019-08-12 PROBLEM — I10 ESSENTIAL (PRIMARY) HYPERTENSION: Chronic | Status: ACTIVE | Noted: 2019-07-25

## 2024-06-09 NOTE — OCCUPATIONAL THERAPY INITIAL EVALUATION ADULT - GENERAL OBSERVATIONS, REHAB EVAL
Name band;
Pt was seen for initial OT consult, encountered OOB to in bed on cardiac monitoring; IV in left UE is infusing. Pt was AA&Ox4, cooperative & followed commands. Pt. Pt moves all extremities without difficulty. Coordination and muscle tone are intact. Pt has good balance

## 2024-12-27 NOTE — PATIENT PROFILE ADULT - BRADEN MOISTURE
Bed: 09  Expected date: 12/27/24  Expected time: 10:27 AM  Means of arrival:   Comments:  ?OD, unresponsive   (4) rarely moist

## 2025-02-25 NOTE — OCCUPATIONAL THERAPY INITIAL EVALUATION ADULT - ORIENTATION, REHAB EVAL
How Many Skin Cancers Have You Had?: more than one
What Is The Reason For Today's Visit?: History of Non-Melanoma Skin Cancer
When Was Your Last Cancer Diagnosed?: 2/2023
oriented to person, place, time and situation